# Patient Record
Sex: MALE | Race: WHITE | NOT HISPANIC OR LATINO | Employment: FULL TIME | URBAN - METROPOLITAN AREA
[De-identification: names, ages, dates, MRNs, and addresses within clinical notes are randomized per-mention and may not be internally consistent; named-entity substitution may affect disease eponyms.]

---

## 2018-02-02 ENCOUNTER — OFFICE VISIT CONVERTED (OUTPATIENT)
Dept: SURGERY | Facility: CLINIC | Age: 48
End: 2018-02-02
Attending: NURSE PRACTITIONER

## 2018-02-26 ENCOUNTER — OFFICE VISIT CONVERTED (OUTPATIENT)
Dept: SURGERY | Facility: CLINIC | Age: 48
End: 2018-02-26
Attending: NURSE PRACTITIONER

## 2020-01-06 ENCOUNTER — OFFICE VISIT CONVERTED (OUTPATIENT)
Dept: SURGERY | Facility: CLINIC | Age: 50
End: 2020-01-06
Attending: NURSE PRACTITIONER

## 2020-01-06 ENCOUNTER — CONVERSION ENCOUNTER (OUTPATIENT)
Dept: SURGERY | Facility: CLINIC | Age: 50
End: 2020-01-06

## 2020-01-08 ENCOUNTER — HOSPITAL ENCOUNTER (OUTPATIENT)
Dept: GENERAL RADIOLOGY | Facility: HOSPITAL | Age: 50
Discharge: HOME | End: 2020-01-08
Attending: NURSE PRACTITIONER

## 2020-01-08 ENCOUNTER — HOSPITAL ENCOUNTER (OUTPATIENT)
Dept: OTHER | Facility: HOSPITAL | Age: 50
Discharge: HOME | End: 2020-01-08
Attending: NURSE PRACTITIONER

## 2020-01-10 ENCOUNTER — HOSPITAL ENCOUNTER (OUTPATIENT)
Dept: NUCLEAR MEDICINE | Facility: HOSPITAL | Age: 50
Discharge: HOME | End: 2020-01-10
Attending: NURSE PRACTITIONER

## 2020-03-17 ENCOUNTER — OFFICE VISIT CONVERTED (OUTPATIENT)
Dept: GASTROENTEROLOGY | Facility: CLINIC | Age: 50
End: 2020-03-17
Attending: NURSE PRACTITIONER

## 2020-05-27 ENCOUNTER — TELEPHONE CONVERTED (OUTPATIENT)
Dept: GASTROENTEROLOGY | Facility: CLINIC | Age: 50
End: 2020-05-27
Attending: NURSE PRACTITIONER

## 2020-10-12 ENCOUNTER — OFFICE VISIT CONVERTED (OUTPATIENT)
Dept: SURGERY | Facility: CLINIC | Age: 50
End: 2020-10-12
Attending: SURGERY

## 2020-10-17 ENCOUNTER — HOSPITAL ENCOUNTER (OUTPATIENT)
Dept: PREADMISSION TESTING | Facility: HOSPITAL | Age: 50
Discharge: HOME | End: 2020-10-17
Attending: SURGERY

## 2020-10-17 LAB — SARS-COV-2 RNA SPEC QL NAA+PROBE: NOT DETECTED

## 2020-10-22 ENCOUNTER — HOSPITAL ENCOUNTER (OUTPATIENT)
Dept: PERIOP | Facility: HOSPITAL | Age: 50
Setting detail: HOSPITAL OUTPATIENT SURGERY
Discharge: HOME | End: 2020-10-22
Attending: SURGERY

## 2020-10-22 LAB
ANION GAP SERPL CALC-SCNC: 18 MMOL/L (ref 8–19)
BUN SERPL-MCNC: 14 MG/DL (ref 5–25)
BUN/CREAT SERPL: 18 {RATIO} (ref 6–20)
CALCIUM SERPL-MCNC: 10 MG/DL (ref 8.7–10.4)
CHLORIDE SERPL-SCNC: 99 MMOL/L (ref 99–111)
CONV CO2: 22 MMOL/L (ref 22–32)
CREAT UR-MCNC: 0.79 MG/DL (ref 0.7–1.2)
GFR SERPLBLD BASED ON 1.73 SQ M-ARVRAT: >60 ML/MIN/{1.73_M2}
GLUCOSE BLD-MCNC: 195 MG/DL (ref 70–99)
GLUCOSE SERPL-MCNC: 222 MG/DL (ref 70–99)
OSMOLALITY SERPL CALC.SUM OF ELEC: 287 MOSM/KG (ref 273–304)
POTASSIUM SERPL-SCNC: 4.4 MMOL/L (ref 3.5–5.3)
SODIUM SERPL-SCNC: 135 MMOL/L (ref 135–147)

## 2020-11-06 ENCOUNTER — OFFICE VISIT CONVERTED (OUTPATIENT)
Dept: SURGERY | Facility: CLINIC | Age: 50
End: 2020-11-06
Attending: SURGERY

## 2020-11-20 ENCOUNTER — CONVERSION ENCOUNTER (OUTPATIENT)
Dept: SURGERY | Facility: CLINIC | Age: 50
End: 2020-11-20

## 2020-11-20 ENCOUNTER — OFFICE VISIT CONVERTED (OUTPATIENT)
Dept: SURGERY | Facility: CLINIC | Age: 50
End: 2020-11-20
Attending: SURGERY

## 2021-02-11 ENCOUNTER — HOSPITAL ENCOUNTER (OUTPATIENT)
Dept: URGENT CARE | Facility: CLINIC | Age: 51
Discharge: HOME | End: 2021-02-11
Attending: EMERGENCY MEDICINE

## 2021-05-10 NOTE — H&P
History and Physical      Patient Name: Brice Trevino   Patient ID: [id number]   Sex: Male   YOB: 1970    Primary Care Provider: Rigo FREDERICK   Referring Provider: Roxie ARREDONDO    Visit Date: October 12, 2020    Provider: Alexis Jerez MD   Location: [facility]   Location Address: [address] [place] [address]  [id number]   Location Phone: [phone]          Chief Complaint  · Outpatient History & Physical / Surgical Orders  · Hernia Consult      History Of Present Illness     Mr. Trevino came in today for evaluation. He is a very nice gentleman who has a symptomatic umbilical hernia. He was doing some lifting here recently and began to have more discomfort. He came in today to be seen.       Past Medical History  Aneurysm; Cervicalgia; Diabetes; Head ache; High blood pressure; Kidney stones; TIA (transient ischemic attack)         Past Surgical History  Achilles tendon repair, left; Appendectomy; Colonoscopy; EGD; Hernia; Hernia Repair; Knee repair; Lasik         Medication List  Florajen3 460 mg (7.5-6- 1.5 bill. cell) oral capsule; glyburide 5 mg oral tablet; losartan 100 mg oral tablet; metformin 1,000 mg oral tablet; Nexium oral; Norvasc 5 mg oral tablet; Victoza 2-Lee 0.6 mg/0.1 mL (18 mg/3 mL) subcutaneous pen injector         Allergy List  I.V. Dye; iodine       Allergies Reconciled  Family Medical History  Cancer, Unspecified; Diabetes, unspecified type; Family history of breast cancer; -Father's Family History Unknown; Bladder calculus; No family history of colorectal cancer         Social History  Alcohol (Current some day); Caffeine (Current every day); lives with spouse; ; Second hand smoke exposure (Never); Tobacco (Former); Working         Review of Systems  · Integument  o Admits  o : skin lesion or lump      Vitals  Date Time BP Position Site L\R Cuff Size HR RR TEMP (F) WT  HT  BMI kg/m2 BSA m2 O2 Sat FR L/min FiO2 HC       10/12/2020  01:40 PM       16  265lbs 0oz 6'   35.94 2.47             Physical Examination  · Constitutional  o Appearance  o : well-nourished, well developed, alert, in no acute distress  · Head and Face  o Head  o :   § Inspection  § : atraumatic, normocephalic  · Neck  o Inspection/Palpation  o : supple, normal range of motion  · Respiratory  o Inspection of Chest  o : normal inspection  o Auscultation of Lungs  o : breath sounds normal, no distress, clear to ascultate bilaterally  · Cardiovascular  o Heart  o :   § Auscultation of Heart  § : regular rate and rhythm, no murmur, gallop or rub  · Gastrointestinal  o Abdominal Examination  o : normal bowel sounds, non-tender, soft. Umbilical hernia noted.          Assessment  · Pre-Surgical Orders     V72.84  · Hernia, Umibilical     553.1       Very nice gentleman who has a symptomatic umbilical hernia.       Plan  · Orders  o MG Pre-Op Covid-19 Screening (19702) - 553.1 - 10/17/2020  o General Surgery Order (GENOR) - 553.1 - 10/22/2020  · Medications  o Medications have been Reconciled  o Transition of Care or Provider Policy  · Instructions  o PLAN:  o Handouts Provided-Pre-Procedure Instructions including date and time and location of procedure.  o Surgical Facility: [facility]  o ****Surgical Orders****  o ****Patient Status****  o Outpatient  o RISK AND BENEFITS:  o Consent for surgery: Given these options, the patient has verbally expressed an understanding of the risks of surgery and finds these risks acceptable. We will proceed with surgery as soon as possible.  o Consult Anesthesia for any post-operative block, or any pain management procedure deemed necessary by the anestesiologist for adequate post-operative pain control.   o O.R. PREP: Per protocol  o SCD's preoperatively  o PLEASE SIGN PERMIT FOR: Laparoscopic umbilical hernia repair  o *__Kefzol 2 gram IV on call to OR.  o *___The above History and Physical Examination has been completed within 30  days of admission.  o Electronically Identified Patient Education Materials Provided Electronically     We are going to set him up for a laparoscopic umbilical hernia repair. I have described the procedure to him as well as the risks and benefits and he is agreeable to proceeding.             Electronically Signed by: Pebbles Vuong-, -Author on October 13, 2020 01:13:50 PM  Electronically Co-signed by: Alexis Jerez MD -Reviewer on October 19, 2020 04:28:04 PM

## 2021-05-13 NOTE — PROGRESS NOTES
Progress Note      Patient Name: Brice Trevino   Patient ID: [id number]   Sex: Male   YOB: 1970    Primary Care Provider: Rigo FREDERICK   Referring Provider: Roxie ARREDONDO    Visit Date: November 6, 2020    Provider: Alexis Jerez MD   Location: [facility] [place] [facility]   Location Address: [address] [place] [address]  [id number]   Location Phone: [phone]          Chief Complaint  · Follow Up Office Visit      History Of Present Illness     Mr. Trevino came back for follow-up. He is doing okay. He had a laparoscopic umbilical hernia repair done two weeks ago. He had been doing well but then picked up a flower pot a few nights ago and has been having a little bit of discomfort around his bellybutton since then.       Past Medical History  Aneurysm; Cervicalgia; Diabetes; Head ache; High blood pressure; Kidney stones; TIA (transient ischemic attack)         Past Surgical History  Achilles tendon repair, left; Appendectomy; Colonoscopy; EGD; Hernia; Hernia Repair; Knee repair; Lasik         Medication List  Florajen3 460 mg (7.5-6- 1.5 bill. cell) oral capsule; glyburide 5 mg oral tablet; losartan 100 mg oral tablet; metformin 1,000 mg oral tablet; Nexium oral; Norco 5-325 mg oral tablet; Norvasc 5 mg oral tablet; Victoza 2-Lee 0.6 mg/0.1 mL (18 mg/3 mL) subcutaneous pen injector         Allergy List  I.V. Dye; iodine         Family Medical History  Cancer, Unspecified; Diabetes, unspecified type; Family history of breast cancer; -Father's Family History Unknown; Bladder calculus; No family history of colorectal cancer         Social History  Alcohol (Current some day); Caffeine (Current every day); lives with spouse; ; Second hand smoke exposure (Never); Tobacco (Former); Working         Review of Systems  · Cardiovascular  o Denies  o : chest pain, irregular heart beats, rapid heart rate, chest pain on exertion, shortness of breath, lower extremity  swelling  · Respiratory  o Denies  o : shortness of breath, wheezing, cough, wheezing, chronic cough, coughing up blood  · Gastrointestinal  o Denies  o : nausea, vomiting, diarrhea, chronic abdominal pain, reflux symptoms      Vitals  Date Time BP Position Site L\R Cuff Size HR RR TEMP (F) WT  HT  BMI kg/m2 BSA m2 O2 Sat FR L/min FiO2 HC       11/06/2020 09:36 AM       16  265lbs 0oz 6'   35.94 2.47             Physical Examination     Today on physical exam, his incisions look good. There is no evidence of infection.           Assessment  · Postoperative Exam Following Surgery     V67.00       Doing okay status post laparoscopic umbilical hernia repair.       Plan  · Medications  o Medications have been Reconciled  o Transition of Care or Provider Policy     I have reassured him that I think everything should be okay. I will see him back in two weeks and we will make some plans then back when to go back to work.             Electronically Signed by: Pebbles Vuong-, -Author on November 9, 2020 10:51:35 AM  Electronically Co-signed by: Alexis Jerez MD -Reviewer on November 10, 2020 09:03:28 AM

## 2021-05-13 NOTE — PROGRESS NOTES
Progress Note      Patient Name: Brice Trevino   Patient ID: [id number]   Sex: Male   YOB: 1970    Primary Care Provider: Rigo FREDERICK   Referring Provider: Roxie ARREDONDO    Visit Date: November 20, 2020    Provider: Alexis Jerez MD   Location: [facility]   Location Address: [address] [place] [address]  [id number]   Location Phone: [phone]          Chief Complaint  · Follow Up Office Visit      History Of Present Illness     Brice came in today for evaluation. He is doing well following a laparoscopic umbilical hernia repair. He is still a little bit sore but is doing better.       Past Medical History  Aneurysm; Cervicalgia; Diabetes; Head ache; High blood pressure; Kidney stones; TIA (transient ischemic attack)         Past Surgical History  Achilles tendon repair, left; Appendectomy; Colonoscopy; EGD; Hernia; Hernia Repair; Knee repair; Lasik         Medication List  Florajen3 460 mg (7.5-6- 1.5 bill. cell) oral capsule; glyburide 5 mg oral tablet; losartan 100 mg oral tablet; metformin 1,000 mg oral tablet; Nexium oral; Norco 5-325 mg oral tablet; Norvasc 5 mg oral tablet; Victoza 2-Lee 0.6 mg/0.1 mL (18 mg/3 mL) subcutaneous pen injector         Allergy List  I.V. Dye; iodine         Family Medical History  Cancer, Unspecified; Diabetes, unspecified type; Family history of breast cancer; -Father's Family History Unknown; Bladder calculus; No family history of colorectal cancer         Social History  Alcohol (Current some day); Caffeine (Current every day); lives with spouse; ; Second hand smoke exposure (Never); Tobacco (Former); Working         Review of Systems  · Cardiovascular  o Denies  o : chest pain, irregular heart beats, rapid heart rate, chest pain on exertion, shortness of breath, lower extremity swelling  · Respiratory  o Denies  o : shortness of breath, wheezing, cough, wheezing, chronic cough, coughing up  blood  · Gastrointestinal  o Denies  o : nausea, vomiting, diarrhea, chronic abdominal pain, reflux symptoms      Vitals  Date Time BP Position Site L\R Cuff Size HR RR TEMP (F) WT  HT  BMI kg/m2 BSA m2 O2 Sat FR L/min FiO2 HC       11/20/2020 09:04 AM       16  264lbs 0oz 6'   35.8 2.47             Physical Examination     Today on physical exam, his incisions look good. His hernia repair feels intact.           Assessment  · Postoperative Exam Following Surgery     V67.00       Doing well status post laparoscopic umbilical hernia repair.       Plan  · Medications  o Medications have been Reconciled  o Transition of Care or Provider Policy  · Instructions  o Follow up as needed.            Electronically Signed by: Pebbles Vuong-, -Author on November 20, 2020 01:44:07 PM  Electronically Co-signed by: Alexis Jerez MD -Reviewer on November 24, 2020 09:10:56 AM

## 2021-05-13 NOTE — PROGRESS NOTES
Quick Note      Patient Name: Brice Trevino   Patient ID: [id number]   Sex: Male   YOB: 1970    Primary Care Provider: Rigo FREDERICK   Referring Provider: Roxie ARREDONDO    Visit Date: May 27, 2020    Provider: MARIA ELENA Dao   Location: [facility]   Location Address: [address] [place] [address], [address]  [place]  [id number]   Location Phone: [phone]          History Of Present Illness  TELEHEALTH TELEPHONE VISIT  Chief Complaint: f/u constipation and gastroparesis   Brice Trevino is a 50 year old /White male who is presenting for evaluation via telehealth telephone visit. Verbal consent obtained before beginning visit.   Provider spent 13 minutes with the patient during telehealth visit.   The following staff were present during this visit: Ольга Devi MA; MARIA ELENA Dao   Past Medical History/Overview of Patient Symptoms     He reports experiencing diarrhea about 2 weeks ago while taking miralax daily and probiotics.  Stopped miralax and diarrhea resolved.      He reports that he's trying to follow gastroparesis diet.  He denies abdominal pain and vomiting.  He states that his last episode of vomiting was in February.                 Vitals  Date Time BP Position Site L\R Cuff Size HR RR TEMP (F) WT  HT  BMI kg/m2 BSA m2 O2 Sat HC       2020 11:04 AM         265lbs 0oz 6'   35.94 2.47               Assessment  · Gastroparesis     536.3/K31.84    Problems Reconciled  Plan  · Orders  o Physician Telephone Evaluation, 11-20 minutes ([id number]) - - 2020  · Medications  o Florajen3 460 mg (7.5-6- 1.5 bill. cell) oral capsule   SIG: take 1 capsule by oral route daily for 30 days   DISP: (30) capsules with 5 refills  Refilled on 2020     o Miralax 17 gram/dose oral powder   SI capful mixed with 8 oz liquid by mouth daily   DISP: (1) 510 gm jar with 5 refills  Discontinued on 2020     o Medications have been  Reconciled  · Instructions  o Plan Of Care: Continue probiotics.   · Disposition  o Follow up 6 months            Electronically Signed by: MARIA ELENA Dao -Author on May 27, 2020 01:01:10 PM

## 2021-05-14 VITALS — HEIGHT: 72 IN | BODY MASS INDEX: 35.89 KG/M2 | RESPIRATION RATE: 16 BRPM | WEIGHT: 265 LBS

## 2021-05-14 VITALS — HEIGHT: 72 IN | WEIGHT: 264 LBS | RESPIRATION RATE: 16 BRPM | BODY MASS INDEX: 35.76 KG/M2

## 2021-05-14 VITALS — BODY MASS INDEX: 35.89 KG/M2 | RESPIRATION RATE: 16 BRPM | WEIGHT: 265 LBS | HEIGHT: 72 IN

## 2021-05-15 VITALS — BODY MASS INDEX: 36.7 KG/M2 | WEIGHT: 271 LBS | RESPIRATION RATE: 16 BRPM | HEIGHT: 72 IN

## 2021-05-15 VITALS — WEIGHT: 265 LBS | HEIGHT: 72 IN | BODY MASS INDEX: 35.89 KG/M2

## 2021-05-15 VITALS
DIASTOLIC BLOOD PRESSURE: 77 MMHG | TEMPERATURE: 97.9 F | WEIGHT: 261.12 LBS | SYSTOLIC BLOOD PRESSURE: 121 MMHG | BODY MASS INDEX: 35.37 KG/M2 | HEIGHT: 72 IN | HEART RATE: 83 BPM

## 2021-05-16 VITALS — HEIGHT: 72 IN | WEIGHT: 296 LBS | RESPIRATION RATE: 16 BRPM | BODY MASS INDEX: 40.09 KG/M2

## 2021-05-16 VITALS — RESPIRATION RATE: 14 BRPM | WEIGHT: 295 LBS | BODY MASS INDEX: 39.96 KG/M2 | HEIGHT: 72 IN

## 2022-01-27 ENCOUNTER — OFFICE VISIT (OUTPATIENT)
Dept: SURGERY | Facility: CLINIC | Age: 52
End: 2022-01-27

## 2022-01-27 ENCOUNTER — PREP FOR SURGERY (OUTPATIENT)
Dept: OTHER | Facility: HOSPITAL | Age: 52
End: 2022-01-27

## 2022-01-27 VITALS — RESPIRATION RATE: 17 BRPM | BODY MASS INDEX: 36.03 KG/M2 | WEIGHT: 266 LBS | HEIGHT: 72 IN | HEART RATE: 97 BPM

## 2022-01-27 DIAGNOSIS — R10.12 LUQ PAIN: ICD-10-CM

## 2022-01-27 DIAGNOSIS — K21.9 GASTROESOPHAGEAL REFLUX DISEASE WITHOUT ESOPHAGITIS: Primary | ICD-10-CM

## 2022-01-27 DIAGNOSIS — K21.9 GERD WITHOUT ESOPHAGITIS: Primary | ICD-10-CM

## 2022-01-27 DIAGNOSIS — R19.7 DIARRHEA, UNSPECIFIED TYPE: ICD-10-CM

## 2022-01-27 DIAGNOSIS — R19.7 DIARRHEA: ICD-10-CM

## 2022-01-27 DIAGNOSIS — Z86.39 HISTORY OF DIABETIC GASTROPARESIS: ICD-10-CM

## 2022-01-27 PROCEDURE — 99203 OFFICE O/P NEW LOW 30 MIN: CPT | Performed by: NURSE PRACTITIONER

## 2022-01-27 RX ORDER — PANTOPRAZOLE SODIUM 40 MG/1
40 TABLET, DELAYED RELEASE ORAL DAILY
COMMUNITY
Start: 2021-12-23

## 2022-01-27 RX ORDER — DICLOFENAC SODIUM 75 MG/1
75 TABLET, DELAYED RELEASE ORAL
COMMUNITY
Start: 2021-12-03

## 2022-01-27 RX ORDER — SODIUM CHLORIDE 0.9 % (FLUSH) 0.9 %
3 SYRINGE (ML) INJECTION EVERY 12 HOURS SCHEDULED
Status: CANCELLED | OUTPATIENT
Start: 2022-01-27

## 2022-01-27 RX ORDER — SODIUM CHLORIDE 0.9 % (FLUSH) 0.9 %
10 SYRINGE (ML) INJECTION AS NEEDED
Status: CANCELLED | OUTPATIENT
Start: 2022-01-27

## 2022-01-27 RX ORDER — LOSARTAN POTASSIUM 100 MG/1
100 TABLET ORAL DAILY
COMMUNITY
Start: 2021-12-28

## 2022-01-27 RX ORDER — ALBUTEROL SULFATE 90 UG/1
2 AEROSOL, METERED RESPIRATORY (INHALATION)
COMMUNITY
Start: 2021-12-03

## 2022-01-27 RX ORDER — TOPIRAMATE 100 MG/1
TABLET, FILM COATED ORAL
Status: ON HOLD | COMMUNITY
End: 2022-02-18

## 2022-01-27 RX ORDER — LIRAGLUTIDE 6 MG/ML
INJECTION SUBCUTANEOUS
COMMUNITY
Start: 2021-12-03

## 2022-01-27 RX ORDER — DIAZEPAM 5 MG/1
TABLET ORAL
COMMUNITY
Start: 2021-12-03

## 2022-01-27 RX ORDER — DAPAGLIFLOZIN 10 MG/1
TABLET, FILM COATED ORAL
COMMUNITY

## 2022-01-27 RX ORDER — AMLODIPINE BESYLATE 5 MG/1
5 TABLET ORAL DAILY
COMMUNITY
Start: 2021-12-03

## 2022-01-27 RX ORDER — GLYBURIDE 5 MG/1
TABLET ORAL
COMMUNITY
Start: 2021-12-03

## 2022-01-27 RX ORDER — POLYETHYLENE GLYCOL 3350 17 G/17G
POWDER, FOR SOLUTION ORAL
Qty: 238 PACKET | Refills: 0 | Status: SHIPPED | OUTPATIENT
Start: 2022-01-27

## 2022-01-27 RX ORDER — BLOOD-GLUCOSE METER
EACH MISCELLANEOUS
COMMUNITY
Start: 2021-12-03

## 2022-01-27 NOTE — PROGRESS NOTES
Chief Complaint: Colonoscopy (consult)    Subjective      EGD & Colonoscopy consultation       History of Present Illness  Brice Trevino is a 51 y.o. male presents to [facility] for an EGD & Colonoscopy consultation.    Patient presents today on referral from Rigo Patterson for an EGD and colonoscopy consultation.    Patient admits to heartburn and indigestion despite taking Protonix.    Reports that he has full before finishing meals.    Patient reports that he has had on and off diarrhea associated with some left upper quadrant pain.    Denies any rectal bleeding.    Admits to family history of colon  cancer with his father.    Patient reports he has a history of gastroparesis secondary to his diabetes.    Objective     Past Medical History:   Diagnosis Date   • Diabetes mellitus (HCC)    • Gastroparesis    • Hypertension        Past Surgical History:   Procedure Laterality Date   • ACHILLES TENDON REPAIR  2009   • APPENDECTOMY  2005   • HERNIA REPAIR  1976   • KNEE SURGERY  2003   • LASIK  2007   • UMBILICAL HERNIA REPAIR  2020       Outpatient Medications Marked as Taking for the 1/27/22 encounter (Office Visit) with Roxie Landa, MARIA ELENA   Medication Sig Dispense Refill   • albuterol sulfate  (90 Base) MCG/ACT inhaler Inhale 2 puffs.     • amLODIPine (NORVASC) 5 MG tablet Take 5 mg by mouth Daily.     • diazePAM (VALIUM) 5 MG tablet TAKE 1/2 tablet BY MOUTH EVERY 12 HOURS AS NEEDED FOR back pain and SPASMS (must last 30 DAYS)     • diclofenac (VOLTAREN) 75 MG EC tablet Take 75 mg by mouth.     • Easy Comfort Pen Needles 31G X 5 MM misc inject into THE SKIN AS DIRECTED TWICE DAILY     • glyburide (DIAbeta) 5 MG tablet Take 1 tablet by mouth 2 (two) times daily with meals.     • losartan (COZAAR) 100 MG tablet Take 100 mg by mouth Daily.     • metFORMIN (GLUCOPHAGE) 1000 MG tablet Take 1 tablet by mouth 2 (two) times daily with meals.     • pantoprazole (PROTONIX) 40 MG  "EC tablet Take 40 mg by mouth Daily.     • Victoza 18 MG/3ML solution pen-injector injection INJECT 1.8 MG SUBCUTANEOUSLY ONCE DAILY         Allergies   Allergen Reactions   • Contrast Dye Swelling   • Iodine Swelling   • Lisinopril Unknown - High Severity     Causes cough        Family History   Problem Relation Age of Onset   • Cancer Mother    • Cancer Father        Social History     Socioeconomic History   • Marital status:    Tobacco Use   • Smoking status: Former Smoker     Quit date:      Years since quittin.0   • Smokeless tobacco: Never Used   Vaping Use   • Vaping Use: Never used   Substance and Sexual Activity   • Alcohol use: Yes     Comment: very rare   • Drug use: Never       Review of Systems   Constitutional: Negative for chills and fever.   Gastrointestinal: Positive for abdominal pain, diarrhea, GERD and indigestion. Negative for abdominal distention, anal bleeding, blood in stool, constipation, nausea, rectal pain and vomiting.        Vital Signs:   Pulse 97   Resp 17   Ht 182.9 cm (72\")   Wt 121 kg (266 lb)   BMI 36.08 kg/m²      Physical Exam  Constitutional:       Appearance: Normal appearance.   HENT:      Head: Normocephalic.   Cardiovascular:      Rate and Rhythm: Normal rate.   Pulmonary:      Effort: Pulmonary effort is normal.   Abdominal:      General: Abdomen is flat.      Palpations: Abdomen is soft.      Tenderness: There is abdominal tenderness.   Skin:     General: Skin is warm and dry.   Neurological:      General: No focal deficit present.      Mental Status: He is alert and oriented to person, place, and time.   Psychiatric:         Mood and Affect: Mood normal.         Thought Content: Thought content normal.          Result Review :              []  Laboratory  []  Radiology  []  Pathology  []  Microbiology  []  EKG/Telemetry   []  Cardiology/Vascular   [x]  Old records  Today I have reviewed Rigo Hazel previous office note     Assessment and Plan  "   Diagnoses and all orders for this visit:    1. Gastroesophageal reflux disease without esophagitis (Primary)    2. Diarrhea, unspecified type    3. LUQ pain    Other orders  -     polyethylene glycol (MIRALAX) 17 g packet; Take as directed.  Instructions given in office.  Dispense: 238 g bottle  Dispense: 238 packet; Refill: 0     white prep    Follow Up   Return for Scheduled EGD and colonoscopy with Dr. Rosenthal on 2/18/2022 at [facility].     Hospital arrival time 1 PM    Possible risks/complications, benefits, and alternatives to surgical or invasive procedure have been explained to patient and/or legal guardian.     Patient has been evaluated and can tolerate anesthesia and/or sedation. Risks, benefits, and alternatives to anesthesia and sedation have been explained to patient and/or legal guardian.  Patient verbalizes understanding is willing to proceed with the above plan.    Patient was given instructions and counseling regarding his condition or for health maintenance advice. Please see specific information pulled into the AVS if appropriate.

## 2022-02-18 ENCOUNTER — HOSPITAL ENCOUNTER (OUTPATIENT)
Facility: HOSPITAL | Age: 52
Setting detail: HOSPITAL OUTPATIENT SURGERY
Discharge: HOME | End: 2022-02-18
Attending: SURGERY | Admitting: SURGERY

## 2022-02-18 ENCOUNTER — ANESTHESIA EVENT (OUTPATIENT)
Dept: GASTROENTEROLOGY | Facility: HOSPITAL | Age: 52
End: 2022-02-18

## 2022-02-18 ENCOUNTER — ANESTHESIA (OUTPATIENT)
Dept: GASTROENTEROLOGY | Facility: HOSPITAL | Age: 52
End: 2022-02-18

## 2022-02-18 VITALS
BODY MASS INDEX: 35.04 KG/M2 | RESPIRATION RATE: 18 BRPM | HEART RATE: 77 BPM | OXYGEN SATURATION: 98 % | TEMPERATURE: 98 F | WEIGHT: 258.38 LBS | SYSTOLIC BLOOD PRESSURE: 118 MMHG | DIASTOLIC BLOOD PRESSURE: 52 MMHG

## 2022-02-18 DIAGNOSIS — R19.7 DIARRHEA: ICD-10-CM

## 2022-02-18 DIAGNOSIS — K21.9 GERD WITHOUT ESOPHAGITIS: ICD-10-CM

## 2022-02-18 DIAGNOSIS — R10.12 LUQ PAIN: ICD-10-CM

## 2022-02-18 DIAGNOSIS — Z86.39 HISTORY OF DIABETIC GASTROPARESIS: ICD-10-CM

## 2022-02-18 LAB — GLUCOSE BLDC GLUCOMTR-MCNC: 224 MG/DL (ref 70–99)

## 2022-02-18 PROCEDURE — 88305 TISSUE EXAM BY PATHOLOGIST: CPT | Performed by: SURGERY

## 2022-02-18 PROCEDURE — 25010000002 PROPOFOL 10 MG/ML EMULSION: Performed by: NURSE ANESTHETIST, CERTIFIED REGISTERED

## 2022-02-18 PROCEDURE — 82962 GLUCOSE BLOOD TEST: CPT

## 2022-02-18 PROCEDURE — 25010000002 HYDROMORPHONE 1 MG/ML SOLUTION: Performed by: ANESTHESIOLOGY

## 2022-02-18 RX ORDER — SODIUM CHLORIDE 0.9 % (FLUSH) 0.9 %
10 SYRINGE (ML) INJECTION AS NEEDED
Status: DISCONTINUED | OUTPATIENT
Start: 2022-02-18 | End: 2022-02-18 | Stop reason: HOSPADM

## 2022-02-18 RX ORDER — LIDOCAINE HYDROCHLORIDE 20 MG/ML
INJECTION, SOLUTION INFILTRATION; PERINEURAL AS NEEDED
Status: DISCONTINUED | OUTPATIENT
Start: 2022-02-18 | End: 2022-02-18 | Stop reason: SURG

## 2022-02-18 RX ORDER — SODIUM CHLORIDE 0.9 % (FLUSH) 0.9 %
3 SYRINGE (ML) INJECTION EVERY 12 HOURS SCHEDULED
Status: DISCONTINUED | OUTPATIENT
Start: 2022-02-18 | End: 2022-02-18 | Stop reason: HOSPADM

## 2022-02-18 RX ORDER — SODIUM CHLORIDE, SODIUM LACTATE, POTASSIUM CHLORIDE, CALCIUM CHLORIDE 600; 310; 30; 20 MG/100ML; MG/100ML; MG/100ML; MG/100ML
30 INJECTION, SOLUTION INTRAVENOUS CONTINUOUS
Status: DISCONTINUED | OUTPATIENT
Start: 2022-02-18 | End: 2022-02-18 | Stop reason: HOSPADM

## 2022-02-18 RX ORDER — PROPOFOL 10 MG/ML
VIAL (ML) INTRAVENOUS AS NEEDED
Status: DISCONTINUED | OUTPATIENT
Start: 2022-02-18 | End: 2022-02-18 | Stop reason: SURG

## 2022-02-18 RX ADMIN — LIDOCAINE HYDROCHLORIDE 100 MG: 20 INJECTION, SOLUTION INFILTRATION; PERINEURAL at 16:41

## 2022-02-18 RX ADMIN — PROPOFOL 50 MG: 10 INJECTION, EMULSION INTRAVENOUS at 16:41

## 2022-02-18 RX ADMIN — HYDROMORPHONE HYDROCHLORIDE 0.5 MG: 1 INJECTION, SOLUTION INTRAMUSCULAR; INTRAVENOUS; SUBCUTANEOUS at 16:11

## 2022-02-18 RX ADMIN — SODIUM CHLORIDE, POTASSIUM CHLORIDE, SODIUM LACTATE AND CALCIUM CHLORIDE 30 ML/HR: 600; 310; 30; 20 INJECTION, SOLUTION INTRAVENOUS at 13:28

## 2022-02-18 RX ADMIN — PROPOFOL 250 MCG/KG/MIN: 10 INJECTION, EMULSION INTRAVENOUS at 16:41

## 2022-02-18 NOTE — ANESTHESIA PREPROCEDURE EVALUATION
Anesthesia Evaluation     Patient summary reviewed and Nursing notes reviewed   no history of anesthetic complications:  NPO Solid Status: > 8 hours  NPO Liquid Status: > 2 hours           Airway   Mallampati: III  TM distance: >3 FB  Neck ROM: full  No difficulty expected  Dental      Pulmonary - negative pulmonary ROS and normal exam    breath sounds clear to auscultation  Cardiovascular - normal exam  Exercise tolerance: good (4-7 METS)    Rhythm: regular  Rate: normal    (+) hypertension,       Neuro/Psych- negative ROS  GI/Hepatic/Renal/Endo    (+)  GERD,  diabetes mellitus type 2,     Musculoskeletal (-) negative ROS    Abdominal    Substance History - negative use     OB/GYN negative ob/gyn ROS         Other - negative ROS                       Anesthesia Plan    ASA 3     general   (Total IV Anesthesia    Patient understands anesthesia not responsible for dental damage.  )  intravenous induction     Anesthetic plan, all risks, benefits, and alternatives have been provided, discussed and informed consent has been obtained with: patient.    Plan discussed with CRNA.        CODE STATUS:

## 2022-02-18 NOTE — ANESTHESIA POSTPROCEDURE EVALUATION
Patient: Brice Trevino    Procedure Summary     Date: 02/18/22 Room / Location: [facility] ENDOSCOPY 3 / [facility] ENDOSCOPY    Anesthesia Start: 1639 Anesthesia Stop: 1709    Procedures:       COLONOSCOPY WITH RANDOM COLON BX (N/A )      ESOPHAGOGASTRODUODENOSCOPY WITH BX (N/A ) Diagnosis:       GERD without esophagitis      LUQ pain      Diarrhea      History of diabetic gastroparesis      (GERD without esophagitis [K21.9])      (LUQ pain [R10.12])      (Diarrhea [R19.7])      (History of diabetic gastroparesis [Z86.39])    Surgeons: Danial Rosenthal MD Provider: Kostas Jimenez MD    Anesthesia Type: general ASA Status: 3          Anesthesia Type: general    Vitals  Vitals Value Taken Time   /52 02/18/22 1723   Temp 36.7 °C (98 °F) 02/18/22 1723   Pulse 77 02/18/22 1723   Resp 18 02/18/22 1723   SpO2 98 % 02/18/22 1723           Post Anesthesia Care and Evaluation    Patient location during evaluation: bedside  Patient participation: complete - patient participated  Level of consciousness: awake  Pain management: adequate  Airway patency: patent  Anesthetic complications: No anesthetic complications  PONV Status: none  Cardiovascular status: acceptable and stable  Respiratory status: acceptable  Hydration status: acceptable    Comments: An Anesthesiologist personally participated in the most demanding procedures (including induction and emergence if applicable) in the anesthesia plan, monitored the course of anesthesia administration at frequent intervals and remained physically present and available for immediate diagnosis and treatment of emergencies.

## 2022-02-18 NOTE — NURSING NOTE
Updated pt on delay and that surgeon is delayed by previous case in OR. Pt very understanding but states does have a headache and requests medication. Am attempting to get a hold of dr. Jimenez to obtain order for tordol.

## 2022-02-22 LAB
CYTO UR: NORMAL
LAB AP CASE REPORT: NORMAL
LAB AP CLINICAL INFORMATION: NORMAL
PATH REPORT.FINAL DX SPEC: NORMAL
PATH REPORT.GROSS SPEC: NORMAL

## 2022-06-20 ENCOUNTER — HOSPITAL ENCOUNTER (EMERGENCY)
Facility: HOSPITAL | Age: 52
Discharge: HOME | End: 2022-06-20
Attending: EMERGENCY MEDICINE | Admitting: EMERGENCY MEDICINE

## 2022-06-20 ENCOUNTER — APPOINTMENT (OUTPATIENT)
Dept: GENERAL RADIOLOGY | Facility: HOSPITAL | Age: 52
End: 2022-06-20

## 2022-06-20 VITALS
RESPIRATION RATE: 20 BRPM | HEIGHT: 72 IN | TEMPERATURE: 97.7 F | SYSTOLIC BLOOD PRESSURE: 121 MMHG | WEIGHT: 250.66 LBS | BODY MASS INDEX: 33.95 KG/M2 | DIASTOLIC BLOOD PRESSURE: 83 MMHG | HEART RATE: 108 BPM | OXYGEN SATURATION: 97 %

## 2022-06-20 DIAGNOSIS — R07.9 CHEST PAIN, UNSPECIFIED TYPE: Primary | ICD-10-CM

## 2022-06-20 LAB
ALBUMIN SERPL-MCNC: 4.8 G/DL (ref 3.5–5.2)
ALBUMIN/GLOB SERPL: 1.5 G/DL
ALP SERPL-CCNC: 97 U/L (ref 39–117)
ALT SERPL W P-5'-P-CCNC: 24 U/L (ref 1–41)
ANION GAP SERPL CALCULATED.3IONS-SCNC: 15.6 MMOL/L (ref 5–15)
AST SERPL-CCNC: 20 U/L (ref 1–40)
BASOPHILS # BLD AUTO: 0.04 10*3/MM3 (ref 0–0.2)
BASOPHILS NFR BLD AUTO: 0.3 % (ref 0–1.5)
BILIRUB SERPL-MCNC: 1 MG/DL (ref 0–1.2)
BUN SERPL-MCNC: 14 MG/DL (ref 6–20)
BUN/CREAT SERPL: 15.6 (ref 7–25)
CALCIUM SPEC-SCNC: 9.6 MG/DL (ref 8.6–10.5)
CHLORIDE SERPL-SCNC: 102 MMOL/L (ref 98–107)
CK MB SERPL-CCNC: 1.96 NG/ML
CK SERPL-CCNC: 62 U/L (ref 20–200)
CO2 SERPL-SCNC: 18.4 MMOL/L (ref 22–29)
CREAT SERPL-MCNC: 0.9 MG/DL (ref 0.76–1.27)
DEPRECATED RDW RBC AUTO: 40.7 FL (ref 37–54)
EGFRCR SERPLBLD CKD-EPI 2021: 103.4 ML/MIN/1.73
EOSINOPHIL # BLD AUTO: 0.12 10*3/MM3 (ref 0–0.4)
EOSINOPHIL NFR BLD AUTO: 0.8 % (ref 0.3–6.2)
ERYTHROCYTE [DISTWIDTH] IN BLOOD BY AUTOMATED COUNT: 13.3 % (ref 12.3–15.4)
GLOBULIN UR ELPH-MCNC: 3.2 GM/DL
GLUCOSE SERPL-MCNC: 205 MG/DL (ref 65–99)
HCT VFR BLD AUTO: 51.9 % (ref 37.5–51)
HGB BLD-MCNC: 18.2 G/DL (ref 13–17.7)
HOLD SPECIMEN: NORMAL
IMM GRANULOCYTES # BLD AUTO: 0.06 10*3/MM3 (ref 0–0.05)
IMM GRANULOCYTES NFR BLD AUTO: 0.4 % (ref 0–0.5)
LIPASE SERPL-CCNC: 28 U/L (ref 13–60)
LYMPHOCYTES # BLD AUTO: 0.62 10*3/MM3 (ref 0.7–3.1)
LYMPHOCYTES NFR BLD AUTO: 3.9 % (ref 19.6–45.3)
MAGNESIUM SERPL-MCNC: 1.8 MG/DL (ref 1.6–2.6)
MCH RBC QN AUTO: 29.7 PG (ref 26.6–33)
MCHC RBC AUTO-ENTMCNC: 35.1 G/DL (ref 31.5–35.7)
MCV RBC AUTO: 84.7 FL (ref 79–97)
MONOCYTES # BLD AUTO: 0.61 10*3/MM3 (ref 0.1–0.9)
MONOCYTES NFR BLD AUTO: 3.9 % (ref 5–12)
NEUTROPHILS NFR BLD AUTO: 14.26 10*3/MM3 (ref 1.7–7)
NEUTROPHILS NFR BLD AUTO: 90.7 % (ref 42.7–76)
NRBC BLD AUTO-RTO: 0 /100 WBC (ref 0–0.2)
NT-PROBNP SERPL-MCNC: 48.3 PG/ML (ref 0–900)
PLATELET # BLD AUTO: 240 10*3/MM3 (ref 140–450)
PMV BLD AUTO: 10.6 FL (ref 6–12)
POTASSIUM SERPL-SCNC: 4.4 MMOL/L (ref 3.5–5.2)
PROT SERPL-MCNC: 8 G/DL (ref 6–8.5)
QT INTERVAL: 357 MS
QT INTERVAL: 363 MS
RBC # BLD AUTO: 6.13 10*6/MM3 (ref 4.14–5.8)
SODIUM SERPL-SCNC: 136 MMOL/L (ref 136–145)
TROPONIN I SERPL-MCNC: 0 NG/ML (ref 0–0.6)
TROPONIN I SERPL-MCNC: 0 NG/ML (ref 0–0.6)
WBC NRBC COR # BLD: 15.71 10*3/MM3 (ref 3.4–10.8)
WHOLE BLOOD HOLD COAG: NORMAL
WHOLE BLOOD HOLD SPECIMEN: NORMAL

## 2022-06-20 PROCEDURE — 93005 ELECTROCARDIOGRAM TRACING: CPT | Performed by: EMERGENCY MEDICINE

## 2022-06-20 PROCEDURE — 71045 X-RAY EXAM CHEST 1 VIEW: CPT

## 2022-06-20 PROCEDURE — 82553 CREATINE MB FRACTION: CPT

## 2022-06-20 PROCEDURE — 84484 ASSAY OF TROPONIN QUANT: CPT

## 2022-06-20 PROCEDURE — 82550 ASSAY OF CK (CPK): CPT

## 2022-06-20 PROCEDURE — 85025 COMPLETE CBC W/AUTO DIFF WBC: CPT

## 2022-06-20 PROCEDURE — 36415 COLL VENOUS BLD VENIPUNCTURE: CPT

## 2022-06-20 PROCEDURE — 93005 ELECTROCARDIOGRAM TRACING: CPT

## 2022-06-20 PROCEDURE — 80053 COMPREHEN METABOLIC PANEL: CPT

## 2022-06-20 PROCEDURE — 83880 ASSAY OF NATRIURETIC PEPTIDE: CPT

## 2022-06-20 PROCEDURE — 83690 ASSAY OF LIPASE: CPT

## 2022-06-20 PROCEDURE — 83735 ASSAY OF MAGNESIUM: CPT

## 2022-06-20 PROCEDURE — 99285 EMERGENCY DEPT VISIT HI MDM: CPT

## 2022-06-20 RX ORDER — SODIUM CHLORIDE 0.9 % (FLUSH) 0.9 %
10 SYRINGE (ML) INJECTION AS NEEDED
Status: DISCONTINUED | OUTPATIENT
Start: 2022-06-20 | End: 2022-06-20 | Stop reason: HOSPADM

## 2022-06-20 RX ORDER — ASPIRIN 81 MG/1
324 TABLET, CHEWABLE ORAL ONCE
Status: COMPLETED | OUTPATIENT
Start: 2022-06-20 | End: 2022-06-20

## 2022-06-20 RX ADMIN — ASPIRIN 81 MG CHEWABLE TABLET 324 MG: 81 TABLET CHEWABLE at 14:55

## 2022-06-20 NOTE — ED PROVIDER NOTES
"Time: 2:56 PM EDT  Arrived by: private car  Chief Complaint: CP  History provided by: Pt  History is limited by: N/A     History of Present Illness:  Patient is a 52 y.o. male that presents to the emergency department with intense CP that began suddenly this AM. He states he feels like his heart is fluttering. He also c/o N/V. He has a Hx of aortic splenic aneurism and Hx of PVCs. He takes metoprolol. He reports he does not have NTG at home. He is not on blood thinners. He also reports recent intense CP during activity that he took aspirin for.       History provided by:  Patient   used: No    Chest Pain  Pain quality comment:  \"intense\"  Pain radiates to:  Does not radiate  Pain severity:  Moderate  Onset quality:  Sudden  Timing:  Intermittent  Chronicity:  Recurrent  Associated symptoms: nausea and vomiting    Associated symptoms: no back pain, no diaphoresis, no fever, no headache and no shortness of breath    Nausea  The primary symptoms include nausea and vomiting. Primary symptoms do not include fever, diarrhea, dysuria or rash.   The illness does not include chills or back pain.   Vomiting  The primary symptoms include nausea and vomiting. Primary symptoms do not include fever, diarrhea, dysuria or rash.   The illness does not include chills or back pain.       Similar Symptoms Previously: Yes  Recently seen: Yes      Patient Care Team  Primary Care Provider: Rigo Ordonez PA    Past Medical History:     Allergies   Allergen Reactions   • Contrast Dye Swelling   • Iodine Swelling   • Lisinopril Unknown - High Severity     Causes cough     Past Medical History:   Diagnosis Date   • Aneurysm of abdominal vessel (HCC)    • Diabetes mellitus (HCC)    • Gastroparesis    • Hypertension      Past Surgical History:   Procedure Laterality Date   • ACHILLES TENDON REPAIR  2009   • APPENDECTOMY  2005   • COLONOSCOPY N/A 2/18/2022    Procedure: COLONOSCOPY WITH RANDOM COLON BX;  Surgeon: " Danial Rosenthal MD;  Location: [facility] ENDOSCOPY;  Service: General;  Laterality: N/A;  PAN-DIVERTICULOSIS   • ENDOSCOPY N/A 2/18/2022    Procedure: ESOPHAGOGASTRODUODENOSCOPY WITH BX;  Surgeon: Danial Rosenthal MD;  Location: [facility] ENDOSCOPY;  Service: General;  Laterality: N/A;  HIATAL HERNIA   • HERNIA REPAIR  1976   • KNEE SURGERY  2003   • LASIK  2007   • UMBILICAL HERNIA REPAIR  2020     Family History   Problem Relation Age of Onset   • Cancer Mother    • Cancer Father        Home Medications:  Prior to Admission medications    Medication Sig Start Date End Date Taking? Authorizing Provider   albuterol sulfate  (90 Base) MCG/ACT inhaler Inhale 2 puffs. 12/3/21   Samantha Ivey MD   amLODIPine (NORVASC) 5 MG tablet Take 5 mg by mouth Daily. 12/3/21   Samantha Ivey MD   Dapagliflozin Propanediol (Farxiga) 10 MG tablet Farxiga 10 mg oral tablet take 1 tablet (10 mg) by oral route once daily in the morning   Suspended    Samantha Ivey MD   diazePAM (VALIUM) 5 MG tablet TAKE 1/2 tablet BY MOUTH EVERY 12 HOURS AS NEEDED FOR back pain and SPASMS (must last 30 DAYS) 12/3/21   Samantha Ivey MD   diclofenac (VOLTAREN) 75 MG EC tablet Take 75 mg by mouth. 12/3/21   Samantha Ivey MD   Easy Comfort Pen Needles 31G X 5 MM misc inject into THE SKIN AS DIRECTED TWICE DAILY 12/3/21   Samantha Ivey MD   glyburide (DIAbeta) 5 MG tablet Take 1 tablet by mouth 2 (two) times daily with meals. 12/3/21   Samantha Ivey MD   losartan (COZAAR) 100 MG tablet Take 100 mg by mouth Daily. 12/28/21   Samantha Ivey MD   metFORMIN (GLUCOPHAGE) 1000 MG tablet Take 1 tablet by mouth 2 (two) times daily with meals. 12/3/21   Samantha Ivey MD   pantoprazole (PROTONIX) 40 MG EC tablet Take 40 mg by mouth Daily. 12/23/21   Samantha Ivey MD   polyethylene glycol (MIRALAX) 17 g packet Take as directed.  Instructions given in office.  Dispense: 238 g  "bottle 22   Cordell, N   SITagliptin (Januvia) 50 MG tablet     ProviderSamantha MD   Victoza 18 MG/3ML solution pen-injector injection INJECT 1.8 MG SUBCUTANEOUSLY ONCE DAILY 12/3/21   Provider, MD Samantha        Social History:   Social History     Tobacco Use   • Smoking status: Former Smoker     Quit date: 2000     Years since quittin.4   • Smokeless tobacco: Never Used   Vaping Use   • Vaping Use: Never used   Substance Use Topics   • Alcohol use: Yes     Comment: very rare   • Drug use: Never     Recent travel: not applicable     Review of Systems:  Review of Systems   Constitutional: Negative for chills, diaphoresis and fever.   HENT: Negative for ear discharge and nosebleeds.    Eyes: Negative for photophobia.   Respiratory: Negative for shortness of breath.    Cardiovascular: Positive for chest pain.   Gastrointestinal: Positive for nausea and vomiting. Negative for diarrhea.   Genitourinary: Negative for dysuria.   Musculoskeletal: Negative for back pain and neck pain.   Skin: Negative for rash.   Neurological: Negative for headaches.        Physical Exam:  /83   Pulse 108   Temp 97.7 °F (36.5 °C) (Oral)   Resp 20   Ht 182.9 cm (72\")   Wt 114 kg (250 lb 10.6 oz)   SpO2 97%   BMI 34.00 kg/m²     Physical Exam  Vitals and nursing note reviewed.   Constitutional:       General: He is not in acute distress.     Appearance: Normal appearance. He is not toxic-appearing.   HENT:      Head: Normocephalic and atraumatic.      Jaw: There is normal jaw occlusion.   Eyes:      General: Lids are normal.      Extraocular Movements: Extraocular movements intact.      Conjunctiva/sclera: Conjunctivae normal.      Pupils: Pupils are equal, round, and reactive to light.   Cardiovascular:      Rate and Rhythm: Regular rhythm. Tachycardia present.      Pulses: Normal pulses.      Heart sounds: Normal heart sounds.   Pulmonary:      Effort: Pulmonary effort is normal. No respiratory " distress.      Breath sounds: Normal breath sounds. No wheezing or rhonchi.   Abdominal:      General: Abdomen is flat.      Palpations: Abdomen is soft.      Tenderness: There is no abdominal tenderness. There is no guarding or rebound.   Musculoskeletal:         General: Normal range of motion.      Cervical back: Normal range of motion and neck supple.      Right lower leg: No edema.      Left lower leg: No edema.   Skin:     General: Skin is warm and dry.   Neurological:      Mental Status: He is alert and oriented to person, place, and time. Mental status is at baseline.   Psychiatric:         Mood and Affect: Mood normal.                Medications in the Emergency Department:  Medications   sodium chloride 0.9 % flush 10 mL (has no administration in time range)   aspirin chewable tablet 324 mg (324 mg Oral Given 6/20/22 1455)        Labs  Lab Results (last 24 hours)     Procedure Component Value Units Date/Time    POC Troponin I with Hold Tube [[id number]] Collected: 06/20/22 1408    Specimen: Blood Updated: 06/20/22 1427    Narrative:      The following orders were created for panel order POC Troponin I with Hold Tube.  Procedure                               Abnormality         Status                     ---------                               -----------         ------                     POC Troponin I[[id number]]                                                              HOLD Troponin-I Tube[[id number]]                             Final result                 Please view results for these tests on the individual orders.    CBC & Differential [[id number]]  (Abnormal) Collected: 06/20/22 1408    Specimen: Blood Updated: 06/20/22 1422    Narrative:      The following orders were created for panel order CBC & Differential.  Procedure                               Abnormality         Status                     ---------                               -----------         ------                     CBC Auto  Differential[[id number]]        Abnormal            Final result                 Please view results for these tests on the individual orders.    Comprehensive Metabolic Panel [[id number]]  (Abnormal) Collected: 06/20/22 1408    Specimen: Blood Updated: 06/20/22 1448     Glucose 205 mg/dL      BUN 14 mg/dL      Creatinine 0.90 mg/dL      Sodium 136 mmol/L      Potassium 4.4 mmol/L      Chloride 102 mmol/L      CO2 18.4 mmol/L      Calcium 9.6 mg/dL      Total Protein 8.0 g/dL      Albumin 4.80 g/dL      ALT (SGPT) 24 U/L      AST (SGOT) 20 U/L      Alkaline Phosphatase 97 U/L      Total Bilirubin 1.0 mg/dL      Globulin 3.2 gm/dL      A/G Ratio 1.5 g/dL      BUN/Creatinine Ratio 15.6     Anion Gap 15.6 mmol/L      eGFR 103.4 mL/min/1.73      Comment: National Kidney Foundation and American Society of Nephrology (ASN) Task Force recommended calculation based on the Chronic Kidney Disease Epidemiology Collaboration (CKD-EPI) equation refit without adjustment for race.       Narrative:      GFR Normal >60  Chronic Kidney Disease <60  Kidney Failure <15      Lipase [[id number]]  (Normal) Collected: 06/20/22 1408    Specimen: Blood Updated: 06/20/22 1448     Lipase 28 U/L     BNP [[id number]]  (Normal) Collected: 06/20/22 1408    Specimen: Blood Updated: 06/20/22 1441     proBNP 48.3 pg/mL     Narrative:      Among patients with dyspnea, NT-proBNP is highly sensitive for the detection of acute congestive heart failure. In addition NT-proBNP of <300 pg/ml effectively rules out acute congestive heart failure with 99% negative predictive value.    Results may be falsely decreased if patient taking Biotin.      Magnesium [[id number]]  (Normal) Collected: 06/20/22 1408    Specimen: Blood Updated: 06/20/22 1448     Magnesium 1.8 mg/dL     CK Total & CKMB [[id number]]  (Normal) Collected: 06/20/22 1408    Specimen: Blood Updated: 06/20/22 1448     CKMB 1.96 ng/mL      Creatine Kinase 62 U/L     Narrative:      CKMB results may be  falsely decreased if patient taking Biotin.    CBC Auto Differential [[id number]]  (Abnormal) Collected: 06/20/22 1408    Specimen: Blood Updated: 06/20/22 1422     WBC 15.71 10*3/mm3      RBC 6.13 10*6/mm3      Hemoglobin 18.2 g/dL      Hematocrit 51.9 %      MCV 84.7 fL      MCH 29.7 pg      MCHC 35.1 g/dL      RDW 13.3 %      RDW-SD 40.7 fl      MPV 10.6 fL      Platelets 240 10*3/mm3      Neutrophil % 90.7 %      Lymphocyte % 3.9 %      Monocyte % 3.9 %      Eosinophil % 0.8 %      Basophil % 0.3 %      Immature Grans % 0.4 %      Neutrophils, Absolute 14.26 10*3/mm3      Lymphocytes, Absolute 0.62 10*3/mm3      Monocytes, Absolute 0.61 10*3/mm3      Eosinophils, Absolute 0.12 10*3/mm3      Basophils, Absolute 0.04 10*3/mm3      Immature Grans, Absolute 0.06 10*3/mm3      nRBC 0.0 /100 WBC     POC Troponin I [[id number]]  (Normal) Collected: 06/20/22 1409    Specimen: Blood Updated: 06/20/22 1421     Troponin I 0.00 ng/mL      Comment: Serial Number: [id number]:  [id number]       POC Troponin I [[id number]]  (Normal) Collected: 06/20/22 1609    Specimen: Blood Updated: 06/20/22 1621     Troponin I 0.00 ng/mL      Comment: Serial Number: [id number]:  [id number]              Imaging:  XR Chest 1 View    Result Date: 6/20/2022  PROCEDURE: XR CHEST 1 VW  COMPARISON: [facility], , CHEST AP/PA 1 VIEW, 5/13/2020, 7:18.  INDICATIONS: Chest Pain triage protocol/NAUSEA/VOMITING/DIZZINESS  FINDINGS:   The lungs are well-expanded. The heart and pulmonary vasculature are within normal limits. No pleural effusions are identified. There are no active appearing infiltrates.  IMPRESSION: No active disease.  ANA CRISTINA RODRIGEZ MD       Electronically Signed and Approved By: ANA CRISTINA RODRIGEZ MD on 6/20/2022 at 14:46               Procedures:  Procedures    EKG:    Rhythm: Sinus tach  Rate: 109  Intervals: Normal  Waves: RBBB, left anterior fascicular block  ST Segment: Non specific ST abnormalities    EKG Comparison: No  significant changes    Interpreted by me      Progress                            Medical Decision Making:  MDM  Number of Diagnoses or Management Options  Chest pain, unspecified type  Diagnosis management comments: In summary this is a 52-year-old male who presents emerged department with complaints of chest pain.  Low risk heart score.  States has been vomiting lately as well.  Work-up including CBC, CMP, troponin x2 and EKG reveal mild dehydration, normal cardiac enzymes.  Chest x-ray unremarkable.  Patient is feeling better in the Emergency Department after treatment.  He had a heart cath 1 year ago.  Very strict return to ER and follow-up instructions have been provided to the patient.         Final diagnoses:   Chest pain, unspecified type        Disposition:  ED Disposition     ED Disposition   Discharge    Condition   Stable    Comment   --             Documentation assistance provided by Tomy Lorenz acting as scribe for Dr. Mejia Sears MD. Information recorded by the scribe was done at my direction and has been verified and validated by me.          Tomy Lorenz  06/20/22 2107       Mejia Sears MD  06/20/22 3801

## 2022-07-12 ENCOUNTER — HOSPITAL ENCOUNTER (EMERGENCY)
Facility: HOSPITAL | Age: 52
Discharge: LEFT BEFORE DISCHARGE | End: 2022-07-12

## 2022-07-12 PROCEDURE — 99211 OFF/OP EST MAY X REQ PHY/QHP: CPT

## 2022-12-07 ENCOUNTER — TRANSCRIBE ORDERS (OUTPATIENT)
Dept: ADMINISTRATIVE | Facility: HOSPITAL | Age: 52
End: 2022-12-07

## 2022-12-07 DIAGNOSIS — M48.062 NEUROGENIC CLAUDICATION DUE TO LUMBAR SPINAL STENOSIS: Primary | ICD-10-CM

## 2022-12-30 ENCOUNTER — HOSPITAL ENCOUNTER (OUTPATIENT)
Dept: MRI IMAGING | Facility: HOSPITAL | Age: 52
Discharge: HOME | End: 2022-12-30
Admitting: PHYSICIAN ASSISTANT

## 2022-12-30 DIAGNOSIS — M48.062 NEUROGENIC CLAUDICATION DUE TO LUMBAR SPINAL STENOSIS: ICD-10-CM

## 2022-12-30 PROCEDURE — 72148 MRI LUMBAR SPINE W/O DYE: CPT

## 2023-11-14 ENCOUNTER — TRANSCRIBE ORDERS (OUTPATIENT)
Dept: GENERAL RADIOLOGY | Facility: HOSPITAL | Age: 53
End: 2023-11-14
Payer: COMMERCIAL

## 2023-11-14 ENCOUNTER — HOSPITAL ENCOUNTER (OUTPATIENT)
Dept: GENERAL RADIOLOGY | Facility: HOSPITAL | Age: 53
Discharge: HOME | End: 2023-11-14
Payer: COMMERCIAL

## 2023-11-14 DIAGNOSIS — M79.672 LEFT FOOT PAIN: ICD-10-CM

## 2023-11-14 DIAGNOSIS — M79.672 LEFT FOOT PAIN: Primary | ICD-10-CM

## 2023-11-14 PROCEDURE — 73610 X-RAY EXAM OF ANKLE: CPT

## 2023-11-14 PROCEDURE — 73630 X-RAY EXAM OF FOOT: CPT

## 2024-02-23 ENCOUNTER — HOSPITAL ENCOUNTER (EMERGENCY)
Facility: HOSPITAL | Age: 54
Discharge: HOME | End: 2024-02-23
Attending: EMERGENCY MEDICINE
Payer: COMMERCIAL

## 2024-02-23 ENCOUNTER — APPOINTMENT (OUTPATIENT)
Dept: GENERAL RADIOLOGY | Facility: HOSPITAL | Age: 54
End: 2024-02-23
Payer: COMMERCIAL

## 2024-02-23 VITALS
HEART RATE: 90 BPM | HEIGHT: 72 IN | OXYGEN SATURATION: 95 % | TEMPERATURE: 98.2 F | DIASTOLIC BLOOD PRESSURE: 73 MMHG | RESPIRATION RATE: 18 BRPM | WEIGHT: 266.32 LBS | SYSTOLIC BLOOD PRESSURE: 117 MMHG | BODY MASS INDEX: 36.07 KG/M2

## 2024-02-23 DIAGNOSIS — K31.84 GASTROPARESIS: Primary | ICD-10-CM

## 2024-02-23 LAB
ALBUMIN SERPL-MCNC: 4.3 G/DL (ref 3.5–5.2)
ALBUMIN/GLOB SERPL: 1.6 G/DL
ALP SERPL-CCNC: 75 U/L (ref 39–117)
ALT SERPL W P-5'-P-CCNC: 29 U/L (ref 1–41)
ANION GAP SERPL CALCULATED.3IONS-SCNC: 12.3 MMOL/L (ref 5–15)
AST SERPL-CCNC: 21 U/L (ref 1–40)
BASOPHILS # BLD AUTO: 0.05 10*3/MM3 (ref 0–0.2)
BASOPHILS NFR BLD AUTO: 0.8 % (ref 0–1.5)
BILIRUB SERPL-MCNC: 0.4 MG/DL (ref 0–1.2)
BUN SERPL-MCNC: 15 MG/DL (ref 6–20)
BUN/CREAT SERPL: 15.8 (ref 7–25)
CALCIUM SPEC-SCNC: 9.7 MG/DL (ref 8.6–10.5)
CHLORIDE SERPL-SCNC: 103 MMOL/L (ref 98–107)
CO2 SERPL-SCNC: 23.7 MMOL/L (ref 22–29)
CREAT SERPL-MCNC: 0.95 MG/DL (ref 0.76–1.27)
DEPRECATED RDW RBC AUTO: 42.2 FL (ref 37–54)
EGFRCR SERPLBLD CKD-EPI 2021: 95.7 ML/MIN/1.73
EOSINOPHIL # BLD AUTO: 0.13 10*3/MM3 (ref 0–0.4)
EOSINOPHIL NFR BLD AUTO: 2 % (ref 0.3–6.2)
ERYTHROCYTE [DISTWIDTH] IN BLOOD BY AUTOMATED COUNT: 13.3 % (ref 12.3–15.4)
GEN 5 2HR TROPONIN T REFLEX: 11 NG/L
GLOBULIN UR ELPH-MCNC: 2.7 GM/DL
GLUCOSE SERPL-MCNC: 182 MG/DL (ref 65–99)
HCT VFR BLD AUTO: 50.4 % (ref 37.5–51)
HGB BLD-MCNC: 17.5 G/DL (ref 13–17.7)
HOLD SPECIMEN: NORMAL
HOLD SPECIMEN: NORMAL
IMM GRANULOCYTES # BLD AUTO: 0.02 10*3/MM3 (ref 0–0.05)
IMM GRANULOCYTES NFR BLD AUTO: 0.3 % (ref 0–0.5)
LIPASE SERPL-CCNC: 51 U/L (ref 13–60)
LYMPHOCYTES # BLD AUTO: 1.63 10*3/MM3 (ref 0.7–3.1)
LYMPHOCYTES NFR BLD AUTO: 24.8 % (ref 19.6–45.3)
MAGNESIUM SERPL-MCNC: 2.1 MG/DL (ref 1.6–2.6)
MCH RBC QN AUTO: 30.5 PG (ref 26.6–33)
MCHC RBC AUTO-ENTMCNC: 34.7 G/DL (ref 31.5–35.7)
MCV RBC AUTO: 87.8 FL (ref 79–97)
MONOCYTES # BLD AUTO: 0.37 10*3/MM3 (ref 0.1–0.9)
MONOCYTES NFR BLD AUTO: 5.6 % (ref 5–12)
NEUTROPHILS NFR BLD AUTO: 4.36 10*3/MM3 (ref 1.7–7)
NEUTROPHILS NFR BLD AUTO: 66.5 % (ref 42.7–76)
NRBC BLD AUTO-RTO: 0 /100 WBC (ref 0–0.2)
NT-PROBNP SERPL-MCNC: 46.5 PG/ML (ref 0–900)
PLATELET # BLD AUTO: 180 10*3/MM3 (ref 140–450)
PMV BLD AUTO: 10.8 FL (ref 6–12)
POTASSIUM SERPL-SCNC: 4.7 MMOL/L (ref 3.5–5.2)
PROT SERPL-MCNC: 7 G/DL (ref 6–8.5)
RBC # BLD AUTO: 5.74 10*6/MM3 (ref 4.14–5.8)
SODIUM SERPL-SCNC: 139 MMOL/L (ref 136–145)
TROPONIN T DELTA: 0 NG/L
TROPONIN T SERPL HS-MCNC: 11 NG/L
WBC NRBC COR # BLD AUTO: 6.56 10*3/MM3 (ref 3.4–10.8)
WHOLE BLOOD HOLD COAG: NORMAL
WHOLE BLOOD HOLD SPECIMEN: NORMAL

## 2024-02-23 PROCEDURE — 80053 COMPREHEN METABOLIC PANEL: CPT

## 2024-02-23 PROCEDURE — 93010 ELECTROCARDIOGRAM REPORT: CPT | Performed by: INTERNAL MEDICINE

## 2024-02-23 PROCEDURE — 93005 ELECTROCARDIOGRAM TRACING: CPT

## 2024-02-23 PROCEDURE — 25010000002 ONDANSETRON PER 1 MG: Performed by: EMERGENCY MEDICINE

## 2024-02-23 PROCEDURE — 83690 ASSAY OF LIPASE: CPT

## 2024-02-23 PROCEDURE — 85025 COMPLETE CBC W/AUTO DIFF WBC: CPT

## 2024-02-23 PROCEDURE — 96375 TX/PRO/DX INJ NEW DRUG ADDON: CPT

## 2024-02-23 PROCEDURE — 25010000002 DIPHENHYDRAMINE PER 50 MG: Performed by: EMERGENCY MEDICINE

## 2024-02-23 PROCEDURE — 99284 EMERGENCY DEPT VISIT MOD MDM: CPT

## 2024-02-23 PROCEDURE — 71045 X-RAY EXAM CHEST 1 VIEW: CPT

## 2024-02-23 PROCEDURE — 36415 COLL VENOUS BLD VENIPUNCTURE: CPT

## 2024-02-23 PROCEDURE — 25010000002 METOCLOPRAMIDE PER 10 MG: Performed by: EMERGENCY MEDICINE

## 2024-02-23 PROCEDURE — 83880 ASSAY OF NATRIURETIC PEPTIDE: CPT

## 2024-02-23 PROCEDURE — 83735 ASSAY OF MAGNESIUM: CPT

## 2024-02-23 PROCEDURE — 96374 THER/PROPH/DIAG INJ IV PUSH: CPT

## 2024-02-23 PROCEDURE — 84484 ASSAY OF TROPONIN QUANT: CPT

## 2024-02-23 RX ORDER — SODIUM CHLORIDE 0.9 % (FLUSH) 0.9 %
10 SYRINGE (ML) INJECTION AS NEEDED
Status: DISCONTINUED | OUTPATIENT
Start: 2024-02-23 | End: 2024-02-23 | Stop reason: HOSPADM

## 2024-02-23 RX ORDER — DIPHENHYDRAMINE HYDROCHLORIDE 50 MG/ML
25 INJECTION INTRAMUSCULAR; INTRAVENOUS ONCE
Status: COMPLETED | OUTPATIENT
Start: 2024-02-23 | End: 2024-02-23

## 2024-02-23 RX ORDER — METOCLOPRAMIDE HYDROCHLORIDE 5 MG/ML
10 INJECTION INTRAMUSCULAR; INTRAVENOUS ONCE
Status: COMPLETED | OUTPATIENT
Start: 2024-02-23 | End: 2024-02-23

## 2024-02-23 RX ORDER — METOCLOPRAMIDE 10 MG/1
10 TABLET ORAL 4 TIMES DAILY
Qty: 20 TABLET | Refills: 0 | Status: SHIPPED | OUTPATIENT
Start: 2024-02-23

## 2024-02-23 RX ORDER — ONDANSETRON 2 MG/ML
4 INJECTION INTRAMUSCULAR; INTRAVENOUS ONCE
Status: COMPLETED | OUTPATIENT
Start: 2024-02-23 | End: 2024-02-23

## 2024-02-23 RX ADMIN — ONDANSETRON HYDROCHLORIDE 4 MG: 2 SOLUTION INTRAMUSCULAR; INTRAVENOUS at 08:40

## 2024-02-23 RX ADMIN — DIPHENHYDRAMINE HYDROCHLORIDE 25 MG: 50 INJECTION, SOLUTION INTRAMUSCULAR; INTRAVENOUS at 11:57

## 2024-02-23 RX ADMIN — METOCLOPRAMIDE HYDROCHLORIDE 10 MG: 5 INJECTION INTRAMUSCULAR; INTRAVENOUS at 11:57
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
## 2024-02-26 LAB
QT INTERVAL: 378 MS
QT INTERVAL: 386 MS
QTC INTERVAL: 459 MS
QTC INTERVAL: 460 MS

## 2024-09-02 ENCOUNTER — APPOINTMENT (OUTPATIENT)
Dept: CT IMAGING | Facility: HOSPITAL | Age: 54
End: 2024-09-02
Payer: COMMERCIAL

## 2024-09-02 ENCOUNTER — HOSPITAL ENCOUNTER (EMERGENCY)
Facility: HOSPITAL | Age: 54
Discharge: HOME | End: 2024-09-02
Attending: EMERGENCY MEDICINE
Payer: COMMERCIAL

## 2024-09-02 VITALS
TEMPERATURE: 97.6 F | BODY MASS INDEX: 36.67 KG/M2 | RESPIRATION RATE: 18 BRPM | WEIGHT: 270.73 LBS | HEIGHT: 72 IN | DIASTOLIC BLOOD PRESSURE: 98 MMHG | HEART RATE: 63 BPM | SYSTOLIC BLOOD PRESSURE: 152 MMHG | OXYGEN SATURATION: 98 %

## 2024-09-02 DIAGNOSIS — M54.9 CHRONIC MIDLINE BACK PAIN, UNSPECIFIED BACK LOCATION: Primary | ICD-10-CM

## 2024-09-02 DIAGNOSIS — G89.29 CHRONIC MIDLINE BACK PAIN, UNSPECIFIED BACK LOCATION: Primary | ICD-10-CM

## 2024-09-02 LAB
ALBUMIN SERPL-MCNC: 3.8 G/DL (ref 3.5–5.2)
ALBUMIN/GLOB SERPL: 1.3 G/DL
ALP SERPL-CCNC: 64 U/L (ref 39–117)
ALT SERPL W P-5'-P-CCNC: 17 U/L (ref 1–41)
ANION GAP SERPL CALCULATED.3IONS-SCNC: 10.5 MMOL/L (ref 5–15)
AST SERPL-CCNC: 14 U/L (ref 1–40)
BASOPHILS # BLD AUTO: 0.08 10*3/MM3 (ref 0–0.2)
BASOPHILS NFR BLD AUTO: 0.9 % (ref 0–1.5)
BILIRUB SERPL-MCNC: 0.5 MG/DL (ref 0–1.2)
BILIRUB UR QL STRIP: NEGATIVE
BUN SERPL-MCNC: 12 MG/DL (ref 6–20)
BUN/CREAT SERPL: 15.2 (ref 7–25)
CALCIUM SPEC-SCNC: 8.9 MG/DL (ref 8.6–10.5)
CHLORIDE SERPL-SCNC: 103 MMOL/L (ref 98–107)
CLARITY UR: CLEAR
CO2 SERPL-SCNC: 23.5 MMOL/L (ref 22–29)
COLOR UR: YELLOW
CREAT SERPL-MCNC: 0.79 MG/DL (ref 0.76–1.27)
DEPRECATED RDW RBC AUTO: 44.5 FL (ref 37–54)
EGFRCR SERPLBLD CKD-EPI 2021: 106.2 ML/MIN/1.73
EOSINOPHIL # BLD AUTO: 0.17 10*3/MM3 (ref 0–0.4)
EOSINOPHIL NFR BLD AUTO: 2 % (ref 0.3–6.2)
ERYTHROCYTE [DISTWIDTH] IN BLOOD BY AUTOMATED COUNT: 14 % (ref 12.3–15.4)
GLOBULIN UR ELPH-MCNC: 3 GM/DL
GLUCOSE SERPL-MCNC: 141 MG/DL (ref 65–99)
GLUCOSE UR STRIP-MCNC: ABNORMAL MG/DL
HCT VFR BLD AUTO: 45.9 % (ref 37.5–51)
HGB BLD-MCNC: 15.6 G/DL (ref 13–17.7)
HGB UR QL STRIP.AUTO: NEGATIVE
IMM GRANULOCYTES # BLD AUTO: 0.04 10*3/MM3 (ref 0–0.05)
IMM GRANULOCYTES NFR BLD AUTO: 0.5 % (ref 0–0.5)
KETONES UR QL STRIP: NEGATIVE
LEUKOCYTE ESTERASE UR QL STRIP.AUTO: NEGATIVE
LYMPHOCYTES # BLD AUTO: 2.14 10*3/MM3 (ref 0.7–3.1)
LYMPHOCYTES NFR BLD AUTO: 24.9 % (ref 19.6–45.3)
MCH RBC QN AUTO: 29.7 PG (ref 26.6–33)
MCHC RBC AUTO-ENTMCNC: 34 G/DL (ref 31.5–35.7)
MCV RBC AUTO: 87.3 FL (ref 79–97)
MONOCYTES # BLD AUTO: 0.59 10*3/MM3 (ref 0.1–0.9)
MONOCYTES NFR BLD AUTO: 6.9 % (ref 5–12)
NEUTROPHILS NFR BLD AUTO: 5.58 10*3/MM3 (ref 1.7–7)
NEUTROPHILS NFR BLD AUTO: 64.8 % (ref 42.7–76)
NITRITE UR QL STRIP: NEGATIVE
NRBC BLD AUTO-RTO: 0 /100 WBC (ref 0–0.2)
PH UR STRIP.AUTO: 5.5 [PH] (ref 5–8)
PLATELET # BLD AUTO: 181 10*3/MM3 (ref 140–450)
PMV BLD AUTO: 11 FL (ref 6–12)
POTASSIUM SERPL-SCNC: 4.1 MMOL/L (ref 3.5–5.2)
PROT SERPL-MCNC: 6.8 G/DL (ref 6–8.5)
PROT UR QL STRIP: NEGATIVE
RBC # BLD AUTO: 5.26 10*6/MM3 (ref 4.14–5.8)
SODIUM SERPL-SCNC: 137 MMOL/L (ref 136–145)
SP GR UR STRIP: >1.03 (ref 1–1.03)
UROBILINOGEN UR QL STRIP: ABNORMAL
WBC NRBC COR # BLD AUTO: 8.6 10*3/MM3 (ref 3.4–10.8)

## 2024-09-02 PROCEDURE — 25810000003 SODIUM CHLORIDE 0.9 % SOLUTION: Performed by: STUDENT IN AN ORGANIZED HEALTH CARE EDUCATION/TRAINING PROGRAM

## 2024-09-02 PROCEDURE — 74176 CT ABD & PELVIS W/O CONTRAST: CPT

## 2024-09-02 PROCEDURE — 99284 EMERGENCY DEPT VISIT MOD MDM: CPT

## 2024-09-02 PROCEDURE — 85025 COMPLETE CBC W/AUTO DIFF WBC: CPT | Performed by: STUDENT IN AN ORGANIZED HEALTH CARE EDUCATION/TRAINING PROGRAM

## 2024-09-02 PROCEDURE — 81003 URINALYSIS AUTO W/O SCOPE: CPT | Performed by: STUDENT IN AN ORGANIZED HEALTH CARE EDUCATION/TRAINING PROGRAM

## 2024-09-02 PROCEDURE — 25010000002 KETOROLAC TROMETHAMINE PER 15 MG: Performed by: STUDENT IN AN ORGANIZED HEALTH CARE EDUCATION/TRAINING PROGRAM

## 2024-09-02 PROCEDURE — 80053 COMPREHEN METABOLIC PANEL: CPT | Performed by: STUDENT IN AN ORGANIZED HEALTH CARE EDUCATION/TRAINING PROGRAM

## 2024-09-02 PROCEDURE — 96374 THER/PROPH/DIAG INJ IV PUSH: CPT

## 2024-09-02 RX ORDER — KETOROLAC TROMETHAMINE 30 MG/ML
30 INJECTION, SOLUTION INTRAMUSCULAR; INTRAVENOUS ONCE
Status: COMPLETED | OUTPATIENT
Start: 2024-09-02 | End: 2024-09-02

## 2024-09-02 RX ORDER — METHYLPREDNISOLONE 4 MG
TABLET, DOSE PACK ORAL
Qty: 21 TABLET | Refills: 0 | Status: SHIPPED | OUTPATIENT
Start: 2024-09-02

## 2024-09-02 RX ORDER — FREMANEZUMAB-VFRM 225 MG/1.5ML
225 INJECTION SUBCUTANEOUS
COMMUNITY

## 2024-09-02 RX ORDER — GABAPENTIN 600 MG/1
600 TABLET ORAL 3 TIMES DAILY
COMMUNITY
Start: 2024-04-22

## 2024-09-02 RX ORDER — METOPROLOL SUCCINATE 25 MG/1
1 TABLET, EXTENDED RELEASE ORAL DAILY
COMMUNITY
Start: 2024-06-27

## 2024-09-02 RX ORDER — METHOCARBAMOL 750 MG/1
1 TABLET, FILM COATED ORAL 3 TIMES DAILY
COMMUNITY

## 2024-09-02 RX ORDER — BROMPHENIRAMINE MALEATE, PSEUDOEPHEDRINE HYDROCHLORIDE, AND DEXTROMETHORPHAN HYDROBROMIDE 2; 30; 10 MG/5ML; MG/5ML; MG/5ML
5 SYRUP ORAL 3 TIMES DAILY PRN
COMMUNITY

## 2024-09-02 RX ADMIN — KETOROLAC TROMETHAMINE 30 MG: 30 INJECTION, SOLUTION INTRAMUSCULAR; INTRAVENOUS at 07:51

## 2024-09-02 RX ADMIN — SODIUM CHLORIDE 1000 ML: 9 INJECTION, SOLUTION INTRAVENOUS at 07:49

## 2024-09-02 NOTE — DISCHARGE INSTRUCTIONS
Continue with already prescribed medicines. Follow up with your primary care next week. Return to ER with any worsening symptoms.

## 2024-09-02 NOTE — ED PROVIDER NOTES
Time: 0911 AM EDT  Date of encounter:  9/2/2024  Independent Historian/Clinical History and Information was obtained by:   Patient    History is limited by: N/A    Chief Complaint: Back pain      History of Present Illness:  Patient is a 54 y.o. year old male who presents to the emergency department for evaluation of midline back pain for the past 2 days.  Patient denies any known injury or fall, but does have history of chronic back pain.  He states that he got up to use the restroom early Saturday morning, noticed the pain while he was trying to urinate.  Denies any burning or hematuria.      Patient Care Team  Primary Care Provider: Rigo Ordonez PA    Past Medical History:     Allergies   Allergen Reactions    Contrast Dye (Echo Or Unknown Ct/Mr) Swelling    Iodine Swelling    Lisinopril Unknown - High Severity     Causes cough     Past Medical History:   Diagnosis Date    Aneurysm of abdominal vessel     Diabetes mellitus     Gastroparesis     Hypertension      Past Surgical History:   Procedure Laterality Date    ACHILLES TENDON REPAIR  2009    APPENDECTOMY  2005    COLONOSCOPY N/A 2/18/2022    Procedure: COLONOSCOPY WITH RANDOM COLON BX;  Surgeon: Danial Rosenthal MD;  Location: [facility] ENDOSCOPY;  Service: General;  Laterality: N/A;  PAN-DIVERTICULOSIS    ENDOSCOPY N/A 2/18/2022    Procedure: ESOPHAGOGASTRODUODENOSCOPY WITH BX;  Surgeon: Danial Rosenthal MD;  Location: [facility] ENDOSCOPY;  Service: General;  Laterality: N/A;  HIATAL HERNIA    HERNIA REPAIR  1976    KNEE SURGERY  2003    LASIK  2007    UMBILICAL HERNIA REPAIR  2020     Family History   Problem Relation Age of Onset    Cancer Mother     Cancer Father        Home Medications:  Prior to Admission medications    Medication Sig Start Date End Date Taking? Authorizing Provider   gabapentin (NEURONTIN) 600 MG tablet Take 1 tablet by mouth 3 (Three) Times a Day. 4/22/24  Yes Provider, MD Samantha   metoprolol succinate XL  (TOPROL-XL) 25 MG 24 hr tablet Take 1 tablet by mouth Daily. 6/27/24  Yes Samantha Ivey MD   albuterol sulfate  (90 Base) MCG/ACT inhaler Inhale 2 puffs. 12/3/21   Samantha Ivey MD   amLODIPine (NORVASC) 5 MG tablet Take 5 mg by mouth Daily. 12/3/21   Samantha Ivey MD   brompheniramine-pseudoephedrine-DM 30-2-10 MG/5ML syrup Take 5 mL by mouth 3 (Three) Times a Day As Needed for Cough.    Samantha Ivey MD   Dapagliflozin Propanediol (Farxiga) 10 MG tablet Farxiga 10 mg oral tablet take 1 tablet (10 mg) by oral route once daily in the morning   Suspended    Samantha Ivey MD   diazePAM (VALIUM) 5 MG tablet TAKE 1/2 tablet BY MOUTH EVERY 12 HOURS AS NEEDED FOR back pain and SPASMS (must last 30 DAYS) 12/3/21   Samantha Ivey MD   diclofenac (VOLTAREN) 75 MG EC tablet Take 75 mg by mouth. 12/3/21   Samantha Ivey MD   Easy Comfort Pen Needles 31G X 5 MM misc inject into THE SKIN AS DIRECTED TWICE DAILY 12/3/21   Samantha Ivey MD   Fremanezumab-vfrm (Ajovy) 225 MG/1.5ML solution auto-injector Inject 225 mg under the skin into the appropriate area as directed Every 30 (Thirty) Days.    Samantha Ivey MD   glyburide (DIAbeta) 5 MG tablet Take 1 tablet by mouth 2 (two) times daily with meals. 12/3/21   Samantha Ivey MD   losartan (COZAAR) 100 MG tablet Take 100 mg by mouth Daily. 12/28/21   Samantha Ivey MD   metFORMIN (GLUCOPHAGE) 1000 MG tablet Take 1 tablet by mouth 2 (two) times daily with meals. 12/3/21   Samantha Ivey MD   methocarbamol (ROBAXIN) 750 MG tablet Take 1 tablet by mouth 3 (Three) Times a Day.    Samantha Ivey MD   methylPREDNISolone (MEDROL) 4 MG dose pack Take as directed on package instructions. 9/2/24   David Canas PA-C   metoclopramide (REGLAN) 10 MG tablet Take 1 tablet by mouth 4 (Four) Times a Day. 2/23/24   Mejia Sears MD   pantoprazole (PROTONIX) 40 MG EC tablet Take 40 mg by mouth  "Daily. 21   Samantha Ivey MD   polyethylene glycol (MIRALAX) 17 g packet Take as directed.  Instructions given in office.  Dispense: 238 g bottle 22   Cordell AprilMARIA ELENA   SITagliptin (Januvia) 50 MG tablet     Samantha Ivey MD   Victoza 18 MG/3ML solution pen-injector injection INJECT 1.8 MG SUBCUTANEOUSLY ONCE DAILY 12/3/21   Samantha Ivey MD        Social History:   Social History     Tobacco Use    Smoking status: Former     Current packs/day: 0.00     Types: Cigarettes     Quit date:      Years since quittin.6    Smokeless tobacco: Never   Vaping Use    Vaping status: Never Used   Substance Use Topics    Alcohol use: Yes     Comment: very rare    Drug use: Never         Review of Systems:  Review of Systems   Musculoskeletal:  Positive for back pain.   All other systems reviewed and are negative.       Physical Exam:  /98   Pulse 63   Temp 97.6 °F (36.4 °C) (Oral)   Resp 18   Ht 182.9 cm (72\")   Wt 123 kg (270 lb 11.6 oz)   SpO2 98%   BMI 36.72 kg/m²     Physical Exam  Vitals and nursing note reviewed.        Vital Signs reviewed, nursing note reviewed  Constitutional: Alert, normal weight, normal appearance, no acute distress  HEENT: Normocephalic, atraumatic,  Eyes: PERRL, conjunctivae normal, extraocular movements intact  Cardiovascular: Normal rate, regular rhythm, heart sounds normal\  Pulmonary: Effort normal, breath sounds normal  Musculoskeletal: Range of motion normal, midline mid thoracic/upper lumbar pain, right-sided flank pain  Skin: Warm, dry, normal for ethnicity  Neurological: Oriented x 3  Psychiatric/behavioral: Mood normal, thought content normal, judgment normal, behavior normal          Procedures:  Procedures      Medical Decision Making:      Comorbidities that affect care:    None    External Notes reviewed:          The following orders were placed and all results were independently analyzed by me:  Orders Placed This Encounter "   Procedures    CT Abdomen Pelvis Without Contrast    Comprehensive Metabolic Panel    Urinalysis With Culture If Indicated - Urine, Clean Catch    CBC Auto Differential    CBC & Differential       Medications Given in the Emergency Department:  Medications   sodium chloride 0.9 % bolus 1,000 mL (0 mL Intravenous Stopped 9/2/24 0819)   ketorolac (TORADOL) injection 30 mg (30 mg Intravenous Given 9/2/24 0751)        ED Course:         Labs:    Lab Results (last 24 hours)       Procedure Component Value Units Date/Time    CBC & Differential [[id number]]  (Normal) Collected: 09/02/24 0751    Specimen: Blood Updated: 09/02/24 0805    Narrative:      The following orders were created for panel order CBC & Differential.  Procedure                               Abnormality         Status                     ---------                               -----------         ------                     CBC Auto Differential[[id number]]        Normal              Final result                 Please view results for these tests on the individual orders.    Comprehensive Metabolic Panel [[id number]]  (Abnormal) Collected: 09/02/24 0751    Specimen: Blood Updated: 09/02/24 0822     Glucose 141 mg/dL      BUN 12 mg/dL      Creatinine 0.79 mg/dL      Sodium 137 mmol/L      Potassium 4.1 mmol/L      Chloride 103 mmol/L      CO2 23.5 mmol/L      Calcium 8.9 mg/dL      Total Protein 6.8 g/dL      Albumin 3.8 g/dL      ALT (SGPT) 17 U/L      AST (SGOT) 14 U/L      Alkaline Phosphatase 64 U/L      Total Bilirubin 0.5 mg/dL      Globulin 3.0 gm/dL      A/G Ratio 1.3 g/dL      BUN/Creatinine Ratio 15.2     Anion Gap 10.5 mmol/L      eGFR 106.2 mL/min/1.73     Narrative:      GFR Normal >60  Chronic Kidney Disease <60  Kidney Failure <15      CBC Auto Differential [[id number]]  (Normal) Collected: 09/02/24 0751    Specimen: Blood Updated: 09/02/24 0805     WBC 8.60 10*3/mm3      RBC 5.26 10*6/mm3      Hemoglobin 15.6 g/dL      Hematocrit 45.9 %       MCV 87.3 fL      MCH 29.7 pg      MCHC 34.0 g/dL      RDW 14.0 %      RDW-SD 44.5 fl      MPV 11.0 fL      Platelets 181 10*3/mm3      Neutrophil % 64.8 %      Lymphocyte % 24.9 %      Monocyte % 6.9 %      Eosinophil % 2.0 %      Basophil % 0.9 %      Immature Grans % 0.5 %      Neutrophils, Absolute 5.58 10*3/mm3      Lymphocytes, Absolute 2.14 10*3/mm3      Monocytes, Absolute 0.59 10*3/mm3      Eosinophils, Absolute 0.17 10*3/mm3      Basophils, Absolute 0.08 10*3/mm3      Immature Grans, Absolute 0.04 10*3/mm3      nRBC 0.0 /100 WBC     Urinalysis With Culture If Indicated - Urine, Clean Catch [[id number]]  (Abnormal) Collected: 09/02/24 0807    Specimen: Urine, Clean Catch Updated: 09/02/24 0822     Color, UA Yellow     Appearance, UA Clear     pH, UA 5.5     Specific Gravity, UA >1.030     Glucose, UA >=1000 mg/dL (3+)     Ketones, UA Negative     Bilirubin, UA Negative     Blood, UA Negative     Protein, UA Negative     Leuk Esterase, UA Negative     Nitrite, UA Negative     Urobilinogen, UA 0.2 E.U./dL    Narrative:      In absence of clinical symptoms, the presence of pyuria, bacteria, and/or nitrites on the urinalysis result does not correlate with infection.  Urine microscopic not indicated.             Imaging:    CT Abdomen Pelvis Without Contrast    Result Date: 9/2/2024  CT ABDOMEN PELVIS WO CONTRAST Date of Exam: 9/2/2024 8:30 AM EDT Indication: right flank pain. Comparison: None available. Technique: Axial CT images were obtained of the abdomen and pelvis without the administration of contrast. Reconstructed coronal and sagittal images were also obtained. Automated exposure control and iterative construction methods were used. Findings: Lower Chest: Calcified granuloma in the right lower lobe Organs: No urinary calculi or hydronephrosis. Kidneys, adrenal glands, liver, spleen, pancreas, gallbladder have an unremarkable noncontrast appearance. 1.8 cm peripherally calcified splenic artery aneurysm  Gastrointestinal: Diffuse colonic diverticula without inflammation. No intestinal distention or evident wall thickening. Appendix surgically absent Pelvis: No abnormal fluid collection. Urinary bladder unremarkable Peritoneum/Retroperitoneum: No ascites or pneumoperitoneum. Normal caliber aorta Bones/Soft Tissues: No acute bony abnormality     1. No acute process. No urolithiasis or obstructive uropathy 2. Colonic diverticulosis. 3. 1.8 cm splenic artery aneurysm. Recommend follow-up in 1 year Electronically Signed: Francisco Warner  9/2/2024 8:55 AM EDT  Workstation ID: [id number]       Differential Diagnosis and Discussion:    Back Pain: The patient presents with back pain. My differential diagnosis includes but is not limited to acute spinal epidural abscess, acute spinal epidural bleed, cauda equina syndrome, abdominal aortic aneurysm, aortic dissection, kidney stone, pyelonephritis, musculoskeletal back pain, spinal fracture, and osteoarthritis.     Patient has unremarkable lab workup here today.  Urine is negative for signs of infection and blood.  CT Abdo pelvis does not reveal any acute intra-abdominal findings, no  evidence of kidney stone.  Patient does have a known 1.8 cm splenic artery aneurysm, he he already follows with a specialist for this.  Think his symptoms today are most consistent with acute on chronic back pain.       MDM                     Patient Care Considerations:          Consultants/Shared Management Plan:        Social Determinants of Health:          Disposition and Care Coordination:    Discharged: The patient is suitable and stable for discharge with no need for consideration of admission.        Final diagnoses:   Chronic midline back pain, unspecified back location        ED Disposition       ED Disposition   Discharge    Condition   Stable    Comment   --               This medical record created using voice recognition software.             David Canas PA-C  09/02/24 2428

## 2025-03-31 DIAGNOSIS — I77.1 STRICTURE OF ARTERY: Primary | ICD-10-CM

## 2025-04-25 ENCOUNTER — HOSPITAL ENCOUNTER (OUTPATIENT)
Facility: HOSPITAL | Age: 55
Discharge: HOME | End: 2025-04-25
Admitting: NURSE PRACTITIONER
Payer: COMMERCIAL

## 2025-04-25 ENCOUNTER — OFFICE VISIT (OUTPATIENT)
Age: 55
End: 2025-04-25
Payer: COMMERCIAL

## 2025-04-25 VITALS
RESPIRATION RATE: 16 BRPM | WEIGHT: 270.1 LBS | DIASTOLIC BLOOD PRESSURE: 83 MMHG | BODY MASS INDEX: 36.59 KG/M2 | SYSTOLIC BLOOD PRESSURE: 123 MMHG | HEIGHT: 72 IN

## 2025-04-25 DIAGNOSIS — I72.8 SPLENIC ARTERY ANEURYSM: Primary | ICD-10-CM

## 2025-04-25 DIAGNOSIS — I77.1 STRICTURE OF ARTERY: ICD-10-CM

## 2025-04-25 LAB
BH CV VAS SMA AORTA DIAMETER: 2 CM
BH CV VAS SMA AORTA EDV: 15.9 CM/S
BH CV VAS SMA AORTA PSV: 67.2 CM/S
BH CV VAS SMA CELIAC DIST EDV: 43.8 CM/S
BH CV VAS SMA CELIAC DIST PSV: 111 CM/S
BH CV VAS SMA CELIAC PROX EDV: 53.8 CM/S
BH CV VAS SMA CELIAC PROX PSV: 137 CM/S
BH CV VAS SMA HEPATIC EDV: 25.7 CM/S
BH CV VAS SMA HEPATIC PSV: 78.8 CM/S
BH CV VAS SMA IMA EDV: 22.8 CM/S
BH CV VAS SMA IMA PSV: 139 CM/S
BH CV VAS SMA ORIGIN EDV: 37.9 CM/S
BH CV VAS SMA ORIGIN PSV: 196 CM/S
BH CV VAS SMA SMA DIST EDV: 31 CM/S
BH CV VAS SMA SMA DIST PSV: 149 CM/S
BH CV VAS SMA SMA MID EDV: 25.5 CM/S
BH CV VAS SMA SMA MID PSV: 162 CM/S
BH CV VAS SMA SMA PROX EDV: 36.5 CM/S
BH CV VAS SMA SMA PROX PSV: 165 CM/S
BH CV VAS SMA SPLENIC DIAMETER: 1.4 CM
BH CV VAS SMA SPLENIC EDV: 47.7 CM/S
BH CV VAS SMA SPLENIC PSV: 121 CM/S

## 2025-04-25 PROCEDURE — 93975 VASCULAR STUDY: CPT

## 2025-04-25 RX ORDER — EZETIMIBE 10 MG/1
10 TABLET ORAL DAILY
COMMUNITY
Start: 2024-10-30

## 2025-04-25 RX ORDER — TESTOSTERONE ENANTHATE 50 MG/.5ML
INJECTION SUBCUTANEOUS
COMMUNITY
Start: 2024-08-19

## 2025-04-25 RX ORDER — PROCHLORPERAZINE MALEATE 10 MG
TABLET ORAL
COMMUNITY
Start: 2025-02-10

## 2025-04-25 RX ORDER — SEMAGLUTIDE 1.34 MG/ML
INJECTION, SOLUTION SUBCUTANEOUS
COMMUNITY
Start: 2025-03-19

## 2025-04-25 RX ORDER — UBROGEPANT 100 MG/1
TABLET ORAL
COMMUNITY
Start: 2025-02-10

## 2025-04-25 RX ORDER — GLIMEPIRIDE 4 MG/1
4 TABLET ORAL
COMMUNITY

## 2025-04-25 RX ORDER — TADALAFIL 10 MG/1
TABLET ORAL
COMMUNITY

## 2025-04-25 RX ORDER — SACCHAROMYCES BOULARDII 250 MG
250 CAPSULE ORAL 2 TIMES DAILY
COMMUNITY

## 2025-04-25 NOTE — PROGRESS NOTES
Chief Complaint  Splenic artery aneurysm    Subjective        Brice Trevino presents to [facility] VASCULAR SURGERY  HPI   Brice Trevino is a 55 y.o. male that has been followed in our office for a splenic artery aneurysm. He returns today in follow-up along with a mesenteric artery duplex. He   reports he  has been doing well without hospitalizations or surgeries. He denies postprandial pain, unexplained weight loss, fear of food, or bowel changes. He does have diabetic gastroparesis.   Review of Systems   Constitutional:  Negative for fever.   Eyes:  Negative for visual disturbance.   Cardiovascular:  Negative for leg swelling.   Gastrointestinal:  Negative for abdominal pain.   Musculoskeletal:  Negative for back pain.   Skin:  Negative for color change, pallor and wound.   Neurological:  Negative for dizziness, facial asymmetry, speech difficulty and weakness.        Brice Trevino  reports that he quit smoking about 25 years ago. His smoking use included cigarettes and cigars. He started smoking about 35 years ago. He has a 5 pack-year smoking history. He has been exposed to tobacco smoke. He has never used smokeless tobacco..        Objective   Vital Signs:  Vitals:    04/25/25 0843   BP: 123/83   Resp: 16      Body mass index is 36.62 kg/m².   Class 2 Severe Obesity (BMI >=35 and <=39.9). Obesity-related health conditions include the following: peripheral vascular disease. Obesity is unchanged. BMI is is above average; BMI management plan is completed. We discussed portion control, increasing exercise, and Information on healthy weight added to patient's after visit summary.       Physical Exam  Vitals reviewed.   Constitutional:       Appearance: Normal appearance.   HENT:      Head: Normocephalic.   Cardiovascular:      Rate and Rhythm: Normal rate and regular rhythm.      Pulses: Normal pulses.           Dorsalis pedis pulses are 3+ on the right side and 3+ on the left side.         Posterior tibial pulses are 3+ on the right side and 3+ on the left side.   Pulmonary:      Effort: Pulmonary effort is normal.   Skin:     General: Skin is warm.   Neurological:      General: No focal deficit present.      Mental Status: He is alert and oriented to person, place, and time.   Psychiatric:         Mood and Affect: Mood normal.          Result Review :      Previous mesenteric duplex: 1.4 cm splenic artery aneurysm.    Mesenteric duplex done today: Duplex Mesenteric Complete CAR (04/25/2025 08:38)                     Assessment and Plan     Diagnoses and all orders for this visit:    1. Splenic artery aneurysm (Primary)  -     Duplex Mesenteric Complete CAR; Future        Patient presents today with ongoing management of his splenic artery aneurysm.  Today, it measures 1.38 cm, which is unchanged.  He is not on an aspirin. He is not able to tolerate statins for cholesterol control. He was placed on Zetia but has not started it yet. I advised he start. He  will return in 2 years along with a repeat mesenteric artery duplex.       Follow Up     Return in about 2 years (around 4/25/2027) for mesenteric duplex.  Patient was given instructions and counseling regarding his condition or for health maintenance advice. Please see specific information pulled into the AVS if appropriate.     MARIA ELENA Madrigal

## (undated) DEVICE — SINGLE-USE BIOPSY FORCEPS: Brand: RADIAL JAW 4

## (undated) DEVICE — EGD OR ERCP KIT: Brand: MEDLINE INDUSTRIES, INC.

## (undated) DEVICE — Device: Brand: DEFENDO AIR/WATER/SUCTION AND BIOPSY VALVE

## (undated) DEVICE — COLON KIT: Brand: MEDLINE INDUSTRIES, INC.

## (undated) DEVICE — GLV SURG SENSICARE PI LF PF 7.5 GRN STRL

## (undated) DEVICE — GLV SURG SENSICARE SLT PF LF 7 STRL

## (undated) DEVICE — SOL IRRG H2O PL/BG 1000ML STRL